# Patient Record
Sex: MALE | Race: WHITE | NOT HISPANIC OR LATINO | Employment: OTHER | URBAN - METROPOLITAN AREA
[De-identification: names, ages, dates, MRNs, and addresses within clinical notes are randomized per-mention and may not be internally consistent; named-entity substitution may affect disease eponyms.]

---

## 2018-10-12 ENCOUNTER — TELEPHONE (OUTPATIENT)
Dept: ENDOCRINOLOGY | Facility: HOSPITAL | Age: 73
End: 2018-10-12

## 2018-10-12 DIAGNOSIS — E04.9 THYROID GOITER: ICD-10-CM

## 2018-10-12 DIAGNOSIS — E03.9 HYPOTHYROIDISM, UNSPECIFIED TYPE: Primary | ICD-10-CM

## 2018-10-12 DIAGNOSIS — E04.1 THYROID NODULE: ICD-10-CM

## 2018-10-12 DIAGNOSIS — E03.9 PRIMARY HYPOTHYROIDISM: Primary | ICD-10-CM

## 2018-10-12 DIAGNOSIS — E04.9 GOITER: ICD-10-CM

## 2018-10-12 NOTE — TELEPHONE ENCOUNTER
Patient scheduled for 2-18-19  Got new info from last [facility] lab slip  TSH, Free T4  E04 9, E03 9, E04 1    Mailed new orders & appt card to patient

## 2019-02-25 ENCOUNTER — TELEPHONE (OUTPATIENT)
Dept: ENDOCRINOLOGY | Facility: HOSPITAL | Age: 74
End: 2019-02-25

## 2019-02-25 NOTE — TELEPHONE ENCOUNTER
Patient rescheduled 2/18/19 appt to 4/8/19  He would like results of labs done 2/12/19 (in [employer])   Thanks

## 2019-04-08 ENCOUNTER — OFFICE VISIT (OUTPATIENT)
Dept: ENDOCRINOLOGY | Facility: HOSPITAL | Age: 74
End: 2019-04-08
Payer: MEDICARE

## 2019-04-08 VITALS
HEART RATE: 72 BPM | BODY MASS INDEX: 36.85 KG/M2 | WEIGHT: 257.4 LBS | SYSTOLIC BLOOD PRESSURE: 136 MMHG | DIASTOLIC BLOOD PRESSURE: 88 MMHG | HEIGHT: 70 IN

## 2019-04-08 DIAGNOSIS — E03.9 PRIMARY HYPOTHYROIDISM: Primary | ICD-10-CM

## 2019-04-08 DIAGNOSIS — E04.9 GOITER: ICD-10-CM

## 2019-04-08 DIAGNOSIS — E04.1 THYROID NODULE: ICD-10-CM

## 2019-04-08 PROCEDURE — 99214 OFFICE O/P EST MOD 30 MIN: CPT | Performed by: INTERNAL MEDICINE

## 2019-04-08 RX ORDER — PREDNISONE 20 MG/1
60 TABLET ORAL DAILY
COMMUNITY
End: 2020-04-13 | Stop reason: ALTCHOICE

## 2019-04-08 RX ORDER — OMEPRAZOLE 20 MG/1
20 CAPSULE, DELAYED RELEASE ORAL 2 TIMES DAILY
COMMUNITY

## 2019-04-08 RX ORDER — GLUCOSA SU 2KCL/CHONDROITIN SU 500-400 MG
CAPSULE ORAL
COMMUNITY

## 2019-04-08 RX ORDER — LEVOTHYROXINE SODIUM 0.05 MG/1
50 TABLET ORAL DAILY
COMMUNITY

## 2019-04-08 RX ORDER — LOVASTATIN 40 MG/1
40 TABLET ORAL
COMMUNITY
End: 2020-04-13 | Stop reason: ALTCHOICE

## 2019-04-08 RX ORDER — HYDROXYCHLOROQUINE SULFATE 200 MG/1
400 TABLET, FILM COATED ORAL DAILY
COMMUNITY

## 2019-04-08 RX ORDER — CARVEDILOL 25 MG/1
25 TABLET ORAL 2 TIMES DAILY WITH MEALS
COMMUNITY

## 2019-04-08 RX ORDER — LANOLIN ALCOHOL/MO/W.PET/CERES
CREAM (GRAM) TOPICAL DAILY
COMMUNITY
End: 2020-04-13 | Stop reason: ALTCHOICE

## 2019-04-08 RX ORDER — FUROSEMIDE 40 MG/1
40 TABLET ORAL DAILY
COMMUNITY
End: 2022-04-14 | Stop reason: ALTCHOICE

## 2019-04-08 RX ORDER — MELATONIN
1000 DAILY
COMMUNITY

## 2019-04-08 RX ORDER — FERROUS SULFATE 325(65) MG
325 TABLET ORAL
COMMUNITY

## 2020-04-13 ENCOUNTER — TELEMEDICINE (OUTPATIENT)
Dept: ENDOCRINOLOGY | Facility: HOSPITAL | Age: 75
End: 2020-04-13
Payer: MEDICARE

## 2020-04-13 DIAGNOSIS — E04.1 THYROID NODULE: ICD-10-CM

## 2020-04-13 DIAGNOSIS — E04.1 RIGHT THYROID NODULE: ICD-10-CM

## 2020-04-13 DIAGNOSIS — E03.9 PRIMARY HYPOTHYROIDISM: Primary | ICD-10-CM

## 2020-04-13 DIAGNOSIS — E04.9 GOITER: ICD-10-CM

## 2020-04-13 PROCEDURE — 99213 OFFICE O/P EST LOW 20 MIN: CPT | Performed by: INTERNAL MEDICINE

## 2020-04-13 RX ORDER — LOSARTAN POTASSIUM 25 MG/1
25 TABLET ORAL DAILY
COMMUNITY

## 2020-04-13 RX ORDER — ROSUVASTATIN CALCIUM 5 MG/1
5 TABLET, COATED ORAL DAILY
COMMUNITY

## 2021-04-13 ENCOUNTER — OFFICE VISIT (OUTPATIENT)
Dept: ENDOCRINOLOGY | Facility: HOSPITAL | Age: 76
End: 2021-04-13
Payer: MEDICARE

## 2021-04-13 VITALS
HEIGHT: 70 IN | DIASTOLIC BLOOD PRESSURE: 80 MMHG | SYSTOLIC BLOOD PRESSURE: 140 MMHG | HEART RATE: 70 BPM | WEIGHT: 271 LBS | BODY MASS INDEX: 38.8 KG/M2

## 2021-04-13 DIAGNOSIS — E04.9 GOITER: ICD-10-CM

## 2021-04-13 DIAGNOSIS — E04.1 RIGHT THYROID NODULE: ICD-10-CM

## 2021-04-13 DIAGNOSIS — E03.9 PRIMARY HYPOTHYROIDISM: Primary | ICD-10-CM

## 2021-04-13 PROCEDURE — 99213 OFFICE O/P EST LOW 20 MIN: CPT | Performed by: INTERNAL MEDICINE

## 2021-04-13 RX ORDER — NINTEDANIB 150 MG/1
150 CAPSULE ORAL 2 TIMES DAILY
COMMUNITY

## 2021-04-13 RX ORDER — TORSEMIDE 20 MG/1
40 TABLET ORAL DAILY
COMMUNITY

## 2021-04-13 RX ORDER — ASPIRIN 81 MG/1
81 TABLET, CHEWABLE ORAL DAILY
COMMUNITY

## 2021-04-13 NOTE — PROGRESS NOTES
4/13/2021    Assessment/Plan      Diagnoses and all orders for this visit:    Primary hypothyroidism  -     TSH, 3rd generation Lab Collect; Future  -     T4, free Lab Collect; Future    Goiter  -     TSH, 3rd generation Lab Collect; Future  -     T4, free Lab Collect; Future    Right thyroid nodule  -     TSH, 3rd generation Lab Collect; Future  -     T4, free Lab Collect; Future    Other orders  -     torsemide (DEMADEX) 20 mg tablet; Take 20 mg by mouth daily  -     aspirin 81 mg chewable tablet; Chew 81 mg daily  -     nintedanib esylate (Ofev) 150 MG CAPS capsule; Take 150 mg by mouth 2 (two) times a day         Assessment/Plan:   1  Hypothyroidism  Most recent thyroid function tests are normal   He is both clinically and biochemically euthyroid  He will continue to utilize the same levothyroxine 50 mcg daily  2  Thyroid nodule with goiter  His thyroid nodule is subcentimeter and does not need follow-up with thyroid ultrasounds  At this point, he has no compressive thyroid symptoms so I will follow him over time symptomatically  I have asked him to follow up in 1 year with preceding TSH and free T4       CC:   Hypothyroid, thyroid goiter and nodule follow-up    History of Present Illness     HPI: Kristin Garrett is a 70y o  year old male with history of Hypothyroidism, thyroid goiter with small subcentimeter right thyroid nodule for follow-up visit  He was found to have a goiter and hypothyroidism around the year 2000 after an abnormal thyroid exam was noted by his PCP  In 2005, a small subcentimeter thyroid nodule was discovered but no biopsy was needed  Last thyroid ultrasound in 2014 showed stable size subcentimeter thyroid nodule at 7 mm  He is currently taking levothyroxine 50 mcg daily for his hypothyroidism  He denies heat or cold intolerance, palpitation, tremors, anxiety or depression, diarrhea or constipation, insomnia, or fatigue    He has some dry skin with occasional skin irritation and itching but no brittle nails or hair loss  He denies diplopia  Weight is 14 lb more than 2019 but he says he recently lost 20 lb using the [employer]  Diet program   He has no compressive thyroid symptoms or difficulties with swallowing  Significant gout in the right knee  On prednisone taper and almost completed  Using walker for stability  Has a right foot wound again and going to podiatry  Got both COVID vaccine doses already  Review of Systems   Constitutional: Positive for unexpected weight change  Negative for fatigue  Weight 14 lbs more than 2019  lost 20 lbs recently  Using the [employer] diet  HENT: Negative for trouble swallowing  Eyes: Negative for visual disturbance  Wears glasses  Respiratory: Positive for shortness of breath  Negative for chest tightness  SOB with lung disease  Difficult to move in general    Cardiovascular: Negative for chest pain and palpitations  Gastrointestinal: Negative for abdominal pain, constipation, diarrhea and nausea  Endocrine: Negative for cold intolerance and heat intolerance  Skin: Positive for rash  Has dry skin will have occasional skin irritation with itching  No brittle nails  No hair loss  Neurological: Negative for dizziness, tremors, light-headedness and headaches  Psychiatric/Behavioral: Negative for dysphoric mood and sleep disturbance  The patient is not nervous/anxious          Historical Information   Past Medical History:   Diagnosis Date    Anemia     iron deficiency    Gout     History of hematuria     Hyperlipidemia     Hypertension     Interstitial lung disease ([address] )     Kidney disease     [address]upus [facility] [address] 2019    Osteoarthritis     Prediabetes     hgba1c 6 5%    Prostate cancer ([facility] [address] )     XRT seeds placed    Sleep apnea      Past Surgical History:   Procedure Laterality Date    FOOT SURGERY Right     fusion    OPEN REPAIR PERIARTICULAR FRACTURE / DISLOCATION ELBOW Left 2019    TOE AMPUTATION Right     4th toe    TONSILLECTOMY      TOTAL ANKLE REPLACEMENT Right      Social History   Social History     Substance and Sexual Activity   Alcohol Use Yes    Frequency: 4 or more times a week     Social History     Substance and Sexual Activity   Drug Use Never     Social History     Tobacco Use   Smoking Status Former Smoker    Types: Cigarettes   Smokeless Tobacco Never Used     Family History:   Family History   Problem Relation Age of Onset    Hypertension Mother     Diabetes unspecified Father     No Known Problems Sister     No Known Problems Brother     No Known Problems Sister     No Known Problems Sister     No Known Problems Daughter     Mental retardation Daughter     Other Daughter         motor function abnormalities         Meds/Allergies   Current Outpatient Medications   Medication Sig Dispense Refill    aspirin 81 mg chewable tablet Chew 81 mg daily      carvedilol (COREG) 25 mg tablet Take 25 mg by mouth 2 (two) times a day with meals       cholecalciferol (VITAMIN D3) 1,000 units tablet Take 1,000 Units by mouth daily      Coenzyme Q10 (CO Q10) 100 MG CAPS Take by mouth      ferrous sulfate 325 (65 Fe) mg tablet Take 325 mg by mouth daily with breakfast       hydroxychloroquine (PLAQUENIL) 200 mg tablet Take 400 mg by mouth daily       levothyroxine 50 mcg tablet Take 50 mcg by mouth daily In the afternoon      losartan (COZAAR) 25 mg tablet Take 25 mg by mouth daily      magnesium gluconate (MAGONATE) 500 mg tablet Take 500 mg by mouth daily       nintedanib esylate (Ofev) 150 MG CAPS capsule Take 150 mg by mouth 2 (two) times a day       omeprazole (PriLOSEC) 20 mg delayed release capsule Take 20 mg by mouth daily      rosuvastatin (CRESTOR) 5 mg tablet Take 5 mg by mouth daily      torsemide (DEMADEX) 20 mg tablet Take 20 mg by mouth daily      furosemide (LASIX) 40 mg tablet Take 40 mg by mouth daily        No current facility-administered medications for this visit  Allergies   Allergen Reactions    Plendil [Felodipine] Edema    Cephalosporins      Other reaction(s): Other (See Comments)  Patient was told by outside hospital that this caused kidney disfunction       Objective   Vitals: Blood pressure 140/80, pulse 70, height 5' 10" (1 778 m), weight 123 kg (271 lb)  Invasive Devices     None                 Physical Exam  Vitals signs reviewed  Constitutional:       Appearance: Normal appearance  He is well-developed  He is obese  HENT:      Head: Normocephalic and atraumatic  Eyes:      Extraocular Movements: Extraocular movements intact  Conjunctiva/sclera: Conjunctivae normal       Comments: No lid lag, stare, proptosis, or periorbital edema  Neck:      Musculoskeletal: Normal range of motion and neck supple  Thyroid: No thyromegaly  Vascular: No carotid bruit  Comments: Thyroid larger on the left than the right  No discrete nodules palpable  No bruits over the thyroid gland  Cardiovascular:      Rate and Rhythm: Normal rate and regular rhythm  Heart sounds: Normal heart sounds  No murmur  Pulmonary:      Effort: Pulmonary effort is normal       Breath sounds: Normal breath sounds  No wheezing  Abdominal:      Palpations: Abdomen is soft  Musculoskeletal: Normal range of motion  General: No deformity  Comments: Foot wound right  No tremor of the outstretched hands  Lymphadenopathy:      Cervical: No cervical adenopathy  Skin:     General: Skin is warm and dry  Findings: No erythema or rash  Neurological:      Mental Status: He is alert and oriented to person, place, and time  Deep Tendon Reflexes: Reflexes are normal and symmetric  Comments: Deep tendon reflex at the left patellar area normal without briskness  Right patellar reflex not evaluated due to his gout in that knee  The history was obtained from the review of the chart and from the patient      Lab Results: Blood work done at [facility] on 04/05/2021 showed a TSH of  1 08 with a free T4 of 1 17        Future Appointments   Date Time Provider Marco Barth   4/14/2022 11:20 AM Hemanth Escobar, [address] [facility]

## 2021-04-13 NOTE — PATIENT INSTRUCTIONS
The thyroid blood work is normal    Continue the same levothyroxine 50 mcg daily  Follow up in 1 year with blood work

## 2022-04-14 ENCOUNTER — TELEMEDICINE (OUTPATIENT)
Dept: ENDOCRINOLOGY | Facility: HOSPITAL | Age: 77
End: 2022-04-14
Payer: MEDICARE

## 2022-04-14 DIAGNOSIS — E04.1 RIGHT THYROID NODULE: ICD-10-CM

## 2022-04-14 DIAGNOSIS — E03.9 PRIMARY HYPOTHYROIDISM: Primary | ICD-10-CM

## 2022-04-14 DIAGNOSIS — E04.9 GOITER: ICD-10-CM

## 2022-04-14 PROCEDURE — 99214 OFFICE O/P EST MOD 30 MIN: CPT | Performed by: INTERNAL MEDICINE

## 2022-04-14 RX ORDER — FEBUXOSTAT 40 MG/1
40 TABLET, FILM COATED ORAL DAILY
COMMUNITY
Start: 2022-04-10

## 2022-04-14 NOTE — PATIENT INSTRUCTIONS
Blood work for the thyroid is normal   Continue the same levothyroxine 50 mcg daily  Follow-up in 1 year with blood work  Of note, your hemoglobin A1c was 5 6% which ruled out diabetes

## 2022-04-14 NOTE — PROGRESS NOTES
Virtual Regular Visit    Verification of patient location:    Patient is located in the following state in which I hold an active license [place]      Assessment/Plan:    Problem List Items Addressed This Visit        Endocrine    Goiter    Relevant Orders    TSH, 3rd generation Lab Collect    T4, free Lab Collect    Primary hypothyroidism - Primary    Relevant Orders    TSH, 3rd generation Lab Collect    T4, free Lab Collect    Right thyroid nodule    Relevant Orders    TSH, 3rd generation Lab Collect    T4, free Lab Collect          Assessment and plan:    1  Hypothyroidism  Most recent thyroid function tests are normal   He is clinically and biochemically euthyroid  He will continue the same levothyroxine 50 mcg daily  2  Thyroid goiter with right-sided thyroid nodule  He has no compressive thyroid symptoms  The nodule is subcentimeter in size and does not need follow-up with serial ultrasounds unless something changes in his symptomatology  I have asked him to follow up in 1 year with preceding TSH and free T4  Reason for visit is   Chief Complaint   Patient presents with    Virtual Regular Visit   And hypothyroid, thyroid nodule and goiter follow-up     Encounter provider Soniya Sibley MD    Provider located at [address] [place] [address] [address] [place] [id number]      Recent Visits  Date Type Provider Dept   04/14/22 Telemedicine Soniya Sibley MD [facility] [place]   Showing recent visits within past 7 days and meeting all other requirements  Future Appointments  No visits were found meeting these conditions  Showing future appointments within next 150 days and meeting all other requirements       The patient was identified by name and date of birth   Seven Arias was informed that this is a telemedicine visit and that the visit is being conducted through [facility] and patient was informed this is a secure, HIPAA-complaint platform  He agrees to proceed     My office door was closed  No one else was in the room  He acknowledged consent and understanding of privacy and security of the video platform  The patient has agreed to participate and understands they can discontinue the visit at any time  Patient is aware this is a billable service  Subjective  Kinjal Ramachandran is a 78 y o  male with a history of hypothyroidism and thyroid goiter with subcentimeter right-sided thyroid nodule for follow-up visit via video telemedicine   He was found to have a thyroid goiter and hypothyroidism around the year 2000 after an abnormal thyroid exam was noted by his PCP  A small subcentimeter thyroid nodule was discovered in 2005 but did not need biopsy  Last thyroid ultrasound showed a 7 mm right-sided thyroid nodule that was stable in size and this was in 2014  He is currently taking levothyroxine 50 mcg daily for replacement purposes to treat his hypothyroidism  He denies heat or cold intolerance  He denies palpitation, tremors, diarrhea or constipation, anxiety or depression, or insomnia  He denies fatigue  He reports his weight is stable  He denies brittle nails or hair loss but has very dry skin and some atopic dermatitis rashes would started requiring an injectable medication about 2-3 weeks ago  He denies diplopia  He denies compressive thyroid symptoms or difficulties with swallowing  He reports that he had a episode of immobility in the summer of 2021 and ended up in rehab  He was actually unable to get bad of bed and was in rehab for 2 months  It was thought to be due to a severe gout event  He now has had a skin infection on his left leg because he was getting bed sores on his heels during rehab  He has had an antibiotic and of note the right side is healed up    He has also had some lung issues and a low hemoglobin for which he has been seeing [facility]  Blood work is checked every 2 weeks and he is getting an injection which I believe Vascepa gin  He is post IV iron infusions  It is thought to be partly due to his renal issues        HPI     Past Medical History:   Diagnosis Date    Anemia     iron deficiency    Atopic dermatitis     Gout     History of hematuria     Hyperlipidemia     Hypertension     [facility]stitial [facility] [address] [facility]     Kidney disease     Lupus ([address] ) 2019    Osteoarthritis     Prediabetes     hgba1c 6 5%    Prostate cancer ([address] )     XRT seeds placed    Sleep apnea        Past Surgical History:   Procedure Laterality Date    FOOT SURGERY Right     fusion    OPEN REPAIR PERIARTICULAR FRACTURE / DISLOCATION ELBOW Left 2019    TOE AMPUTATION Right     4th toe    TONSILLECTOMY      TOTAL ANKLE REPLACEMENT Right        Current Outpatient Medications   Medication Sig Dispense Refill    aspirin 81 mg chewable tablet Chew 81 mg daily      carvedilol (COREG) 25 mg tablet Take 25 mg by mouth 2 (two) times a day with meals       cholecalciferol (VITAMIN D3) 1,000 units tablet Take 1,000 Units by mouth daily      Coenzyme Q10 (CO Q10) 100 MG CAPS Take by mouth      Dupilumab (DUPIXENT SC) Inject under the skin every 2-3 weeks      febuxostat (ULORIC) 40 mg tablet Take 40 mg by mouth daily        hydroxychloroquine (PLAQUENIL) 200 mg tablet Take 400 mg by mouth daily       levothyroxine 50 mcg tablet Take 50 mcg by mouth daily In the afternoon      MAGNESIUM GLUCONATE PO Take 400 mg by mouth daily        omeprazole (PriLOSEC) 20 mg delayed release capsule Take 20 mg by mouth 2 (two) times a day        rosuvastatin (CRESTOR) 5 mg tablet Take 5 mg by mouth daily      torsemide (DEMADEX) 20 mg tablet Take 40 mg by mouth daily        ferrous sulfate 325 (65 Fe) mg tablet Take 325 mg by mouth daily with breakfast  (Patient not taking: Reported on 4/14/2022 )      losartan (COZAAR) 25 mg tablet Take 25 mg by mouth daily (Patient not taking: Reported on 4/14/2022 )      nintedanib esylate (Ofev) 150 MG CAPS capsule Take 150 mg by mouth 2 (two) times a day  (Patient not taking: Reported on 4/14/2022 )       No current facility-administered medications for this visit  Allergies   Allergen Reactions    Plendil [Felodipine] Edema    Cephalosporins      Other reaction(s): Other (See Comments)  Patient was told by outside hospital that this caused kidney disfunction       Review of Systems   Constitutional: Negative for fatigue and unexpected weight change  HENT: Negative for trouble swallowing  Eyes: Negative for visual disturbance  No diplopia  Wears glasses  Respiratory: Positive for shortness of breath  Negative for chest tightness  Always short of breath  Cardiovascular: Negative for chest pain and palpitations  Gastrointestinal: Negative for abdominal pain, constipation, diarrhea and nausea  Endocrine: Negative for cold intolerance and heat intolerance  Wakes to urinate frequently  Skin: Negative for rash  Very dry skin and now has a rash diagnosed with atopic dermatitis and is started a new injectable medication 2-3 weeks ago  No brittle nails  No hair loss  Neurological: Negative for dizziness, tremors, light-headedness and headaches  Poor balance since 2021 utilizing a walker  Psychiatric/Behavioral: Negative for dysphoric mood and sleep disturbance  The patient is not nervous/anxious  Video Exam    There were no vitals filed for this visit  Physical Exam     Physical Exam   Constitutional: He is oriented to person, place, and time  He appears well-developed and well-nourished  No distress  HENT:  Wearing glasses  No lid lag, stare, proptosis, or periorbital edema  Head: Normocephalic and atraumatic  Neck: Normal range of motion  No obvious thyroid enlargement  Pulmonary/Chest: Effort normal   No audible wheezing      Musculoskeletal: Normal range of motion  Neurological: He is alert and oriented to person, place, and time  Skin: He is not diaphoretic  Psychiatric: He has a normal mood and affect  His behavior is normal      Blood work:     Blood work done on 04/05/20[facility] at [facility] showed a TSH of 2 47 with a free T4 1  16  Hemoglobin A1c was 5 6%  This may not be accurate due to his anemia  PSA is less than 0 01  Urine microalbumin to creatinine ratio is 32  CMP showed a BUN of 55, creatinine 2 75, GFR 23, glucose 113, but was otherwise normal     Total cholesterol 115, triglyceride 122, HDL 60, LDL 31  CBC shows anemia with a hemoglobin of 8 4  I spent 15 minutes directly with the patient during this visit    VIRTUAL VISIT [facility] verbally agrees to participate in [employer]  Pt is aware that [employer] could be limited without vital signs or the ability to perform a full hands-on physical exam  Daniel Bradley understands he or the provider may request at any time to terminate the video visit and request the patient to seek care or treatment in person